# Patient Record
Sex: FEMALE | Race: BLACK OR AFRICAN AMERICAN | NOT HISPANIC OR LATINO | Employment: UNEMPLOYED | URBAN - METROPOLITAN AREA
[De-identification: names, ages, dates, MRNs, and addresses within clinical notes are randomized per-mention and may not be internally consistent; named-entity substitution may affect disease eponyms.]

---

## 2019-05-20 LAB
ABSOLUTE RETIC: 0.49 M/UL (ref 0.02–0.09)
ALBUMIN: 4.2 G/DL (ref 3.4–5)
ALP ISOS SERPL LEV INH-CCNC: 97 U/L (ref 46–116)
ALT (SGPT): 43 U/L (ref 14–59)
ANION GAP SERPL CALC-SCNC: 12 MMOL/L (ref 4–14)
AST SERPL-CCNC: 74 U/L (ref 15–37)
B19V IGG+IGM PNL SER: 25.8 % (ref 3–15.5)
BASOPHILS - REL (DIFF): 0.8 %
BASOPHILS NFR BLD: 0.09 K/UL (ref 0–0.1)
BILIRUB SERPL-MCNC: 3.3 MG/DL (ref 0.2–1)
BUN SERPL-MCNC: 11 MG/DL (ref 7–18)
BUN/CREAT RATIO: 18
CALCIUM SERPL-MCNC: 9.1 MG/DL (ref 8.5–10.1)
CHLORIDE SERPL-SCNC: 105 MMOL/L (ref 98–107)
CO2 SERPL-SCNC: 25 MMOL/L (ref 21–32)
CREAT SERPL-MCNC: 0.61 MG/DL (ref 0.55–1.02)
EOSINOPHILS - ABS (DIFF): 0.22 K/UL (ref 0–0.5)
EOSINOPHILS - REL (DIFF): 2 %
ERYTHROCYTE [DISTWIDTH] IN BLOOD BY AUTOMATED COUNT: 23.3 % (ref 12.3–17)
FERRITIN: 1755 NG/ML (ref 8–252)
GFR MDRD AF AMER: >60 ML/MIN
GFR MDRD NON AF AMER: >60 ML/MIN
GLUCOSE: 101 MG/DL (ref 74–106)
HCT VFR BLD AUTO: 24.6 % (ref 32–45.4)
HEMOGLOBIN A2: 3.2 % (ref 2.2–3.7)
HEMOGLOBIN F QUANTITATION: 6 % (ref 0–0.9)
HGB BLD-MCNC: 8.4 G/DL (ref 10.9–14.3)
IMM GRAN %: 0.6 % (ref 0–0.9)
IMMATURE GRANS (ABS): 0.07 K/UL (ref 0–0.1)
LYMPH #: 3.4 K/UL (ref 1–3)
LYMPH%: 31.1 %
MCH RBC QN AUTO: 30.4 PG (ref 24.3–33.2)
MCHC RBC AUTO-ENTMCNC: 34.1 G/DL (ref 29.6–33.8)
MCV RBC AUTO: 89.1 FL (ref 79.3–93.5)
MONO #: 1.7 K/UL (ref 0.3–1)
MONO%: 15.5 %
NEU %: 50 %
NEUTROPHILS ABSOLUTE: 5.46 K/UL (ref 1.8–7.8)
NUCLEATED RBC %: 3.2 % (ref 0–0.48)
NUCLEATED RBC ABSOLUTE: 0.4 K/UL
PEDIATRIC MONITOR INTERPRETATION: ABNORMAL
PLATELET COUNT: 328 K/UL (ref 159–386)
PLATELET MORPHOLOGY: NORMAL
PMV BLD AUTO: 11.8 FL (ref 9.1–12.7)
POTASSIUM: 4.4 MMOL/L (ref 3.5–5.1)
RBC # BLD AUTO: 2.76 M/UL (ref 3.63–4.92)
RBC MORPH BLD: NORMAL
RDW-SD: 71.1 FL (ref 37.5–49)
RETIC COUNT: 17.3 % (ref 0.5–2.2)
RETIC-HE: 31.3 PG (ref 28.1–38)
SODIUM: 142 MMOL/L (ref 136–145)
TOTAL PROTEIN: 8.5 G/DL (ref 6.4–8.2)
WBC # BLD AUTO: 10.94 K/UL (ref 3.6–11.2)

## 2019-05-22 LAB
ABO/RH(D): NORMAL
ANTIBODY SCREEN: NEGATIVE
ANTIGEN/ANTIBODY INFO (ON UNITS): NORMAL
BLOOD COMPONENT TYPE: NORMAL
BLOOD COMPONENT TYPE: NORMAL
CROSSMATCH EXPIRATION: NORMAL
CROSSMATCH RESULT: NORMAL
STATUS OF UNIT: NORMAL
TRANSFUSION STATUS: NORMAL
UNIT DIVISION: 0
UNIT NUMBER: NORMAL
UNIT TAG COMMENT: NORMAL
UNITS ORDERED: 1

## 2019-06-03 LAB
ABSOLUTE RETIC: 0.33 M/UL (ref 0.02–0.09)
ALBUMIN: 3.8 G/DL (ref 3.4–5)
ALP ISOS SERPL LEV INH-CCNC: 75 U/L (ref 46–116)
ALT (SGPT): 32 U/L (ref 14–59)
ANION GAP SERPL CALC-SCNC: 8 MMOL/L (ref 4–14)
AST SERPL-CCNC: 49 U/L (ref 15–37)
B19V IGG+IGM PNL SER: 22 % (ref 3–15.5)
BASOPHILS - REL (DIFF): 0.9 %
BASOPHILS NFR BLD: 0.08 K/UL (ref 0–0.1)
BILIRUB SERPL-MCNC: 2.7 MG/DL (ref 0.2–1)
BUN SERPL-MCNC: 9 MG/DL (ref 7–18)
BUN/CREAT RATIO: 15
CALCIUM SERPL-MCNC: 8.8 MG/DL (ref 8.5–10.1)
CHLORIDE SERPL-SCNC: 105 MMOL/L (ref 98–107)
CO2 SERPL-SCNC: 28 MMOL/L (ref 21–32)
CREAT SERPL-MCNC: 0.6 MG/DL (ref 0.55–1.02)
EOSINOPHILS - ABS (DIFF): 0.15 K/UL (ref 0–0.5)
EOSINOPHILS - REL (DIFF): 1.7 %
ERYTHROCYTE [DISTWIDTH] IN BLOOD BY AUTOMATED COUNT: 21.6 % (ref 12.3–17)
FERRITIN: 1622 NG/ML (ref 8–252)
GFR MDRD AF AMER: >60 ML/MIN
GFR MDRD NON AF AMER: >60 ML/MIN
GLUCOSE: 108 MG/DL (ref 74–106)
HCT VFR BLD AUTO: 22.8 % (ref 32–45.4)
HGB BLD-MCNC: 7.6 G/DL (ref 10.9–14.3)
IMM GRAN %: 0.2 % (ref 0–0.9)
IMMATURE GRANS (ABS): 0.02 K/UL (ref 0–0.1)
LYMPH #: 3.16 K/UL (ref 1–3)
LYMPH%: 35.4 %
MCH RBC QN AUTO: 30.2 PG (ref 24.3–33.2)
MCHC RBC AUTO-ENTMCNC: 33.3 G/DL (ref 29.6–33.8)
MCV RBC AUTO: 90.5 FL (ref 79.3–93.5)
MONO #: 1.29 K/UL (ref 0.3–1)
MONO%: 14.4 %
NEU %: 47.4 %
NEUTROPHILS ABSOLUTE: 4.23 K/UL (ref 1.8–7.8)
NUCLEATED RBC %: 1.3 % (ref 0–0.48)
NUCLEATED RBC ABSOLUTE: 0.1 K/UL
PLATELET COUNT: 433 K/UL (ref 159–386)
PMV BLD AUTO: 11.8 FL (ref 9.1–12.7)
POTASSIUM: 3.9 MMOL/L (ref 3.5–5.1)
RBC # BLD AUTO: 2.52 M/UL (ref 3.63–4.92)
RBC MORPH BLD: NORMAL
RDW-SD: 69.5 FL (ref 37.5–49)
RETIC COUNT: 13.3 % (ref 0.5–2.2)
RETIC-HE: 30.3 PG (ref 28.1–38)
SODIUM: 141 MMOL/L (ref 136–145)
TOTAL PROTEIN: 7.6 G/DL (ref 6.4–8.2)
WBC # BLD AUTO: 8.93 K/UL (ref 3.6–11.2)
WBC MORPHOLOGY: NORMAL

## 2019-06-04 LAB
HEMOGLOBIN A2: 2.8 % (ref 2.2–3.7)
HEMOGLOBIN F QUANTITATION: 5.8 % (ref 0–0.9)
PEDIATRIC MONITOR INTERPRETATION: ABNORMAL

## 2019-06-17 LAB
ABSOLUTE RETIC: 0.31 M/UL (ref 0.02–0.09)
ALBUMIN: 3.9 G/DL (ref 3.4–5)
ALP ISOS SERPL LEV INH-CCNC: 80 U/L (ref 45–117)
ALT (SGPT): 29 U/L (ref 13–56)
ANION GAP SERPL CALC-SCNC: 5 MMOL/L (ref 4–14)
AST SERPL-CCNC: 60 U/L (ref 15–37)
B19V IGG+IGM PNL SER: 15.7 % (ref 3–15.5)
BASOPHILS ABSOLUTE COUNT: 0.35 K/UL (ref 0–0.1)
BASOPHILS NFR BLD: 4 %
BILIRUB SERPL-MCNC: 2.5 MG/DL (ref 0.2–1)
BUN SERPL-MCNC: 10 MG/DL (ref 7–18)
BUN/CREAT RATIO: 16.1
CALCIUM SERPL-MCNC: 9.3 MG/DL (ref 8.5–10.1)
CHLORIDE SERPL-SCNC: 108 MMOL/L (ref 98–107)
CO2 SERPL-SCNC: 27 MMOL/L (ref 21–32)
CREAT SERPL-MCNC: 0.62 MG/DL (ref 0.6–1.3)
EOSINOPHIL NFR BLD: 1 %
EOSINOPHILS ABSOLUTE COUNT: 0.09 K/UL (ref 0–0.5)
ERYTHROCYTE [DISTWIDTH] IN BLOOD BY AUTOMATED COUNT: 20.6 % (ref 12.3–17)
GFR MDRD AF AMER: >60 ML/MIN
GFR MDRD NON AF AMER: >60 ML/MIN
GLUCOSE: 95 MG/DL (ref 74–106)
HCT VFR BLD AUTO: 23.6 % (ref 32–45.4)
HGB BLD-MCNC: 8.1 G/DL (ref 10.9–14.3)
LYMPHOCYTES ABSOLUTE COUNT: 2.08 K/UL (ref 1–3)
LYMPHOCYTES RELATIVE PERCENT: 24 %
MCH RBC QN AUTO: 30.2 PG (ref 24.3–33.2)
MCHC RBC AUTO-ENTMCNC: 34.3 G/DL (ref 29.6–33.8)
MCV RBC AUTO: 88.1 FL (ref 79.3–93.5)
MONOCYTE, ABS: 0.78 K/UL (ref 0.3–1)
MONOCYTES %: 9 %
NUCLEATED RBCS: 3 /100 WBC'S
PLATELET COUNT: 323 K/UL (ref 159–386)
PLATELET MORPHOLOGY: ABNORMAL
PMV BLD AUTO: 12 FL (ref 9.1–12.7)
POTASSIUM: 4.4 MMOL/L (ref 3.5–5.1)
PROT ELPH PNL UR ELPH: 62 %
RBC # BLD AUTO: 2.68 M/UL (ref 3.63–4.92)
RBC MORPH BLD: ABNORMAL
RDW-SD: 63.8 FL (ref 37.5–49)
RETIC COUNT: 11.9 % (ref 0.5–2.2)
RETIC-HE: 30.2 PG (ref 28.1–38)
SEGMENTED NEUTS, ABS: 5.38 K/UL (ref 1.8–7.8)
SODIUM: 140 MMOL/L (ref 136–145)
TOTAL PROTEIN: 8.2 G/DL (ref 6.4–8.2)
WBC # BLD AUTO: 8.67 K/UL (ref 3.6–11.2)
WBC MORPHOLOGY: ABNORMAL

## 2019-06-19 LAB
ABO/RH(D): NORMAL
ANTIBODY SCREEN: NEGATIVE
ANTIGEN/ANTIBODY INFO (ON UNITS): NORMAL
ANTIGEN/ANTIBODY INFO (ON UNITS): NORMAL
BLOOD COMPONENT TYPE: NORMAL
CROSSMATCH EXPIRATION: NORMAL
CROSSMATCH RESULT: NORMAL
CROSSMATCH RESULT: NORMAL
STATUS OF UNIT: NORMAL
STATUS OF UNIT: NORMAL
TRANSFUSION STATUS: NORMAL
TRANSFUSION STATUS: NORMAL
UNIT DIVISION: 0
UNIT DIVISION: 0
UNIT NUMBER: NORMAL
UNIT NUMBER: NORMAL
UNIT TAG COMMENT: NORMAL
UNIT TAG COMMENT: NORMAL
UNITS ORDERED: 1

## 2019-07-15 LAB
ABSOLUTE RETIC: 0.27 M/UL (ref 0.02–0.09)
ALBUMIN: 3.9 G/DL (ref 3.4–5)
ALP ISOS SERPL LEV INH-CCNC: 66 U/L (ref 46–116)
ALT (SGPT): 26 U/L (ref 14–59)
ANION GAP SERPL CALC-SCNC: 7 MMOL/L (ref 4–14)
AST SERPL-CCNC: 54 U/L (ref 15–37)
B19V IGG+IGM PNL SER: 33.6 % (ref 3–15.5)
BASOPHILS - REL (DIFF): 0.8 %
BASOPHILS NFR BLD: 0.08 K/UL (ref 0–0.1)
BILIRUB SERPL-MCNC: 2.8 MG/DL (ref 0.2–1)
BUN SERPL-MCNC: 8 MG/DL (ref 7–18)
BUN/CREAT RATIO: 12.5
CALCIUM SERPL-MCNC: 9.2 MG/DL (ref 8.5–10.1)
CHLORIDE SERPL-SCNC: 105 MMOL/L (ref 98–107)
CO2 SERPL-SCNC: 28 MMOL/L (ref 21–32)
CREAT SERPL-MCNC: 0.64 MG/DL (ref 0.55–1.02)
EOSINOPHILS - ABS (DIFF): 0.18 K/UL (ref 0–0.5)
EOSINOPHILS - REL (DIFF): 1.8 %
ERYTHROCYTE [DISTWIDTH] IN BLOOD BY AUTOMATED COUNT: 21.5 % (ref 12.3–17)
FERRITIN: 1704 NG/ML (ref 8–252)
GFR MDRD AF AMER: >60 ML/MIN
GFR MDRD NON AF AMER: >60 ML/MIN
GLUCOSE: 96 MG/DL (ref 74–106)
HCT VFR BLD AUTO: 22.5 % (ref 32–45.4)
HGB BLD-MCNC: 7.7 G/DL (ref 10.9–14.3)
IMM GRAN %: 0.6 % (ref 0–0.9)
IMMATURE GRANS (ABS): 0.06 K/UL (ref 0–0.1)
LYMPH #: 2.39 K/UL (ref 1–3)
LYMPH%: 24.1 %
MCH RBC QN AUTO: 30.6 PG (ref 24.3–33.2)
MCHC RBC AUTO-ENTMCNC: 34.2 G/DL (ref 29.6–33.8)
MCV RBC AUTO: 89.3 FL (ref 79.3–93.5)
MONO #: 1.31 K/UL (ref 0.3–1)
MONO%: 13.2 %
NEU %: 59.5 %
NEUTROPHILS ABSOLUTE: 5.9 K/UL (ref 1.8–7.8)
NUCLEATED RBC %: 1 % (ref 0–0.48)
NUCLEATED RBC ABSOLUTE: 0.1 K/UL
PLATELET COUNT: 316 K/UL (ref 159–386)
PMV BLD AUTO: 12.1 FL (ref 9.1–12.7)
POTASSIUM: 3.9 MMOL/L (ref 3.5–5.1)
RBC # BLD AUTO: 2.52 M/UL (ref 3.63–4.92)
RBC MORPH BLD: NORMAL
RDW-SD: 67.9 FL (ref 37.5–49)
RETIC COUNT: 10.8 % (ref 0.5–2.2)
RETIC-HE: 33.8 PG (ref 28.1–38)
SODIUM: 140 MMOL/L (ref 136–145)
TOTAL PROTEIN: 7.9 G/DL (ref 6.4–8.2)
WBC # BLD AUTO: 9.92 K/UL (ref 3.6–11.2)

## 2019-08-02 PROBLEM — D57.1 SICKLE CELL DISEASE: Status: ACTIVE | Noted: 2019-08-02

## 2019-08-12 LAB
ABSOLUTE RETIC: 0.4 M/UL (ref 0.02–0.09)
ALBUMIN: 4 G/DL (ref 3.4–5)
ALP ISOS SERPL LEV INH-CCNC: 70 U/L (ref 46–116)
ALT (SGPT): 27 U/L (ref 14–59)
ANION GAP SERPL CALC-SCNC: 6 MMOL/L (ref 4–14)
AST SERPL-CCNC: 53 U/L (ref 15–37)
B19V IGG+IGM PNL SER: 27.8 % (ref 3–15.5)
BASOPHILS - REL (DIFF): 0.8 %
BASOPHILS NFR BLD: 0.08 K/UL (ref 0–0.1)
BILIRUB SERPL-MCNC: 2.9 MG/DL (ref 0.2–1)
BUN SERPL-MCNC: 7 MG/DL (ref 7–18)
BUN/CREAT RATIO: 13.2
CALCIUM SERPL-MCNC: 9.6 MG/DL (ref 8.5–10.1)
CHLORIDE SERPL-SCNC: 106 MMOL/L (ref 98–107)
CO2 SERPL-SCNC: 30 MMOL/L (ref 21–32)
CREAT SERPL-MCNC: 0.53 MG/DL (ref 0.55–1.02)
EOSINOPHILS - ABS (DIFF): 0.19 K/UL (ref 0–0.5)
EOSINOPHILS - REL (DIFF): 1.9 %
ERYTHROCYTE [DISTWIDTH] IN BLOOD BY AUTOMATED COUNT: 22 % (ref 12.3–17)
FERRITIN: 1614 NG/ML (ref 8–252)
GFR MDRD AF AMER: >60 ML/MIN
GFR MDRD NON AF AMER: >60 ML/MIN
GLUCOSE: 96 MG/DL (ref 74–106)
HCT VFR BLD AUTO: 22.5 % (ref 32–45.4)
HGB BLD-MCNC: 7.6 G/DL (ref 10.9–14.3)
IMM GRAN %: 0.5 % (ref 0–0.9)
IMMATURE GRANS (ABS): 0.05 K/UL (ref 0–0.1)
LYMPH #: 3.34 K/UL (ref 1–3)
LYMPH%: 33.3 %
MCH RBC QN AUTO: 31 PG (ref 24.3–33.2)
MCHC RBC AUTO-ENTMCNC: 33.8 G/DL (ref 29.6–33.8)
MCV RBC AUTO: 91.8 FL (ref 79.3–93.5)
MONO #: 1.32 K/UL (ref 0.3–1)
MONO%: 13.2 %
NEU %: 50.3 %
NEUTROPHILS ABSOLUTE: 5.05 K/UL (ref 1.8–7.8)
NUCLEATED RBC %: 3.1 % (ref 0–0.48)
NUCLEATED RBC ABSOLUTE: 0.3 K/UL
PLATELET COUNT: 328 K/UL (ref 159–386)
PMV BLD AUTO: 11.6 FL (ref 9.1–12.7)
POTASSIUM: 4.1 MMOL/L (ref 3.5–5.1)
RBC # BLD AUTO: 2.45 M/UL (ref 3.63–4.92)
RBC MORPH BLD: NORMAL
RDW-SD: 69.3 FL (ref 37.5–49)
RETIC COUNT: 17 % (ref 0.5–2.2)
RETIC-HE: 32.9 PG (ref 28.1–38)
SODIUM: 142 MMOL/L (ref 136–145)
TOTAL PROTEIN: 7.8 G/DL (ref 6.4–8.2)
WBC # BLD AUTO: 10.03 K/UL (ref 3.6–11.2)

## 2019-12-16 PROBLEM — D57.1 HB-SS DISEASE WITHOUT CRISIS: Status: ACTIVE | Noted: 2019-12-16

## 2019-12-16 PROBLEM — E83.111 IRON OVERLOAD DUE TO REPEATED RED BLOOD CELL TRANSFUSIONS: Status: ACTIVE | Noted: 2019-12-16

## 2019-12-16 PROBLEM — Z51.5 ENCOUNTER FOR PALLIATIVE CARE INVOLVING MANAGEMENT OF PAIN: Status: ACTIVE | Noted: 2017-08-23

## 2020-01-26 PROBLEM — D57.00 SICKLE CELL ANEMIA WITH PAIN: Status: ACTIVE | Noted: 2020-01-26

## 2020-01-26 PROBLEM — D57.00 HB-SS DISEASE WITH CRISIS: Status: ACTIVE | Noted: 2019-08-02

## 2020-01-27 PROBLEM — I10 HYPERTENSION: Status: ACTIVE | Noted: 2020-01-27

## 2020-01-27 PROBLEM — E80.6 HYPERBILIRUBINEMIA: Status: ACTIVE | Noted: 2020-01-27

## 2020-01-28 PROBLEM — D57.00 HB-SS DISEASE WITH CRISIS: Status: RESOLVED | Noted: 2019-08-02 | Resolved: 2020-01-28

## 2020-03-24 PROBLEM — E83.19 IRON OVERLOAD: Status: ACTIVE | Noted: 2020-03-24

## 2020-03-24 PROBLEM — Z51.5 PALLIATIVE CARE BY SPECIALIST: Status: ACTIVE | Noted: 2020-03-24

## 2020-03-24 PROBLEM — F11.90 CHRONIC, CONTINUOUS USE OF OPIOIDS: Status: ACTIVE | Noted: 2020-03-24

## 2020-03-24 PROBLEM — Z92.89 HISTORY OF PARTIAL EXCHANGE TRANSFUSION: Status: ACTIVE | Noted: 2020-03-24

## 2020-04-28 ENCOUNTER — TELEPHONE (OUTPATIENT)
Dept: ADMINISTRATIVE | Facility: CLINIC | Age: 35
End: 2020-04-28

## 2020-06-09 PROBLEM — D57.1 SICKLE CELL ANEMIA WITHOUT CRISIS: Status: ACTIVE | Noted: 2020-06-09

## 2020-10-01 PROBLEM — G44.51 HEMICRANIA CONTINUA: Status: ACTIVE | Noted: 2020-10-01

## 2020-10-01 PROBLEM — G43.009 MIGRAINE WITHOUT AURA AND WITHOUT STATUS MIGRAINOSUS, NOT INTRACTABLE: Status: ACTIVE | Noted: 2020-10-01

## 2021-01-14 PROBLEM — M79.671 BILATERAL LEG AND FOOT PAIN: Status: ACTIVE | Noted: 2021-01-14

## 2021-01-14 PROBLEM — R74.01 ELEVATED AST (SGOT): Status: ACTIVE | Noted: 2021-01-14

## 2021-01-14 PROBLEM — R70.1 RETICULOCYTOSIS: Status: ACTIVE | Noted: 2021-01-14

## 2021-01-14 PROBLEM — M79.604 BILATERAL LEG AND FOOT PAIN: Status: ACTIVE | Noted: 2021-01-14

## 2021-01-14 PROBLEM — D75.839 THROMBOCYTOSIS: Status: ACTIVE | Noted: 2021-01-14

## 2021-01-14 PROBLEM — G43.709 CHRONIC MIGRAINE WITHOUT AURA WITHOUT STATUS MIGRAINOSUS, NOT INTRACTABLE: Status: ACTIVE | Noted: 2020-10-01

## 2021-01-14 PROBLEM — D57.00 SICKLE CELL PAIN CRISIS: Status: ACTIVE | Noted: 2021-01-14

## 2021-01-14 PROBLEM — E66.9 OBESITY (BMI 30.0-34.9): Status: ACTIVE | Noted: 2021-01-14

## 2021-01-14 PROBLEM — D72.829 LEUKOCYTOSIS: Status: ACTIVE | Noted: 2021-01-14

## 2021-01-14 PROBLEM — M79.672 BILATERAL LEG AND FOOT PAIN: Status: ACTIVE | Noted: 2021-01-14

## 2021-01-14 PROBLEM — M79.605 BILATERAL LEG AND FOOT PAIN: Status: ACTIVE | Noted: 2021-01-14

## 2021-01-15 PROBLEM — D75.839 THROMBOCYTOSIS: Status: RESOLVED | Noted: 2021-01-14 | Resolved: 2021-01-15

## 2021-01-18 PROBLEM — E87.6 HYPOKALEMIA: Status: ACTIVE | Noted: 2021-01-18

## 2021-05-12 ENCOUNTER — PATIENT MESSAGE (OUTPATIENT)
Dept: RESEARCH | Facility: HOSPITAL | Age: 36
End: 2021-05-12

## 2021-05-18 PROBLEM — M25.511 BILATERAL SHOULDER PAIN: Status: ACTIVE | Noted: 2021-05-18

## 2021-05-18 PROBLEM — M25.512 BILATERAL SHOULDER PAIN: Status: ACTIVE | Noted: 2021-05-18

## 2021-08-26 DIAGNOSIS — U07.1 COVID-19 VIRUS DETECTED: ICD-10-CM

## 2021-08-27 PROBLEM — J12.82 PNEUMONIA DUE TO COVID-19 VIRUS: Status: ACTIVE | Noted: 2021-08-27

## 2021-08-27 PROBLEM — J96.01 ACUTE HYPOXEMIC RESPIRATORY FAILURE: Status: ACTIVE | Noted: 2021-08-27

## 2021-08-27 PROBLEM — G43.909 MIGRAINE: Status: ACTIVE | Noted: 2021-08-27

## 2021-08-27 PROBLEM — U07.1 COVID-19: Status: ACTIVE | Noted: 2021-08-27

## 2021-08-29 PROBLEM — E87.6 HYPOKALEMIA: Status: RESOLVED | Noted: 2021-01-18 | Resolved: 2021-08-29

## 2021-08-29 PROBLEM — Z51.5 ENCOUNTER FOR PALLIATIVE CARE INVOLVING MANAGEMENT OF PAIN: Status: RESOLVED | Noted: 2017-08-23 | Resolved: 2021-08-29

## 2021-08-29 PROBLEM — M79.605 BILATERAL LEG AND FOOT PAIN: Status: RESOLVED | Noted: 2021-01-14 | Resolved: 2021-08-29

## 2021-08-29 PROBLEM — R70.1 RETICULOCYTOSIS: Status: RESOLVED | Noted: 2021-01-14 | Resolved: 2021-08-29

## 2021-08-29 PROBLEM — M79.672 BILATERAL LEG AND FOOT PAIN: Status: RESOLVED | Noted: 2021-01-14 | Resolved: 2021-08-29

## 2021-08-29 PROBLEM — D64.9 ACUTE ON CHRONIC ANEMIA: Status: ACTIVE | Noted: 2021-08-29

## 2021-08-29 PROBLEM — E83.111 IRON OVERLOAD DUE TO REPEATED RED BLOOD CELL TRANSFUSIONS: Status: RESOLVED | Noted: 2019-12-16 | Resolved: 2021-08-29

## 2021-08-29 PROBLEM — R74.01 ELEVATED AST (SGOT): Status: RESOLVED | Noted: 2021-01-14 | Resolved: 2021-08-29

## 2021-08-29 PROBLEM — J96.01 ACUTE HYPOXEMIC RESPIRATORY FAILURE: Status: RESOLVED | Noted: 2021-08-27 | Resolved: 2021-08-29

## 2021-08-29 PROBLEM — D72.829 LEUKOCYTOSIS: Status: RESOLVED | Noted: 2021-01-14 | Resolved: 2021-08-29

## 2021-08-29 PROBLEM — M79.604 BILATERAL LEG AND FOOT PAIN: Status: RESOLVED | Noted: 2021-01-14 | Resolved: 2021-08-29

## 2021-08-29 PROBLEM — M25.511 BILATERAL SHOULDER PAIN: Status: RESOLVED | Noted: 2021-05-18 | Resolved: 2021-08-29

## 2021-08-29 PROBLEM — M25.512 BILATERAL SHOULDER PAIN: Status: RESOLVED | Noted: 2021-05-18 | Resolved: 2021-08-29

## 2021-08-29 PROBLEM — D57.00 HB-SS DISEASE WITH CRISIS: Status: RESOLVED | Noted: 2019-08-02 | Resolved: 2021-08-29

## 2021-08-29 PROBLEM — D57.00 SICKLE CELL PAIN CRISIS: Status: RESOLVED | Noted: 2021-01-14 | Resolved: 2021-08-29

## 2021-08-29 PROBLEM — M79.671 BILATERAL LEG AND FOOT PAIN: Status: RESOLVED | Noted: 2021-01-14 | Resolved: 2021-08-29

## 2021-10-05 PROBLEM — D57.1 HB-SS DISEASE WITHOUT CRISIS: Chronic | Status: ACTIVE | Noted: 2019-12-16

## 2022-05-11 PROBLEM — R11.0 NAUSEA: Status: ACTIVE | Noted: 2022-05-11

## 2022-05-18 PROBLEM — G47.01 INSOMNIA DUE TO MEDICAL CONDITION: Status: ACTIVE | Noted: 2022-05-18

## 2022-07-06 PROBLEM — L29.8 ITCHING DUE TO DRUG: Status: ACTIVE | Noted: 2022-07-06

## 2022-07-06 PROBLEM — T50.905A ITCHING DUE TO DRUG: Status: ACTIVE | Noted: 2022-07-06

## 2022-10-16 PROBLEM — D57.01 ACUTE CHEST SYNDROME: Status: ACTIVE | Noted: 2022-10-16

## 2022-10-16 PROBLEM — R09.02 HYPOXIA: Status: ACTIVE | Noted: 2022-10-16

## 2022-10-18 PROBLEM — J18.9 COMMUNITY ACQUIRED PNEUMONIA: Status: ACTIVE | Noted: 2022-10-18

## 2023-01-23 PROBLEM — J18.9 COMMUNITY ACQUIRED PNEUMONIA: Status: RESOLVED | Noted: 2022-10-18 | Resolved: 2023-01-23

## 2023-09-15 ENCOUNTER — PATIENT MESSAGE (OUTPATIENT)
Dept: ADMINISTRATIVE | Facility: CLINIC | Age: 38
End: 2023-09-15

## 2023-09-15 ENCOUNTER — PATIENT OUTREACH (OUTPATIENT)
Dept: ADMINISTRATIVE | Facility: CLINIC | Age: 38
End: 2023-09-15

## 2023-09-15 NOTE — PROGRESS NOTES
C3 nurse attempted to contact Shumearria S Adley for a TCC post hospital discharge follow up call. No answer. Left voicemail with callback information. The patient has a scheduled [facility] appointment with Essence Montelongo on 09/27/2023 @ 0760.

## 2023-09-18 NOTE — PROGRESS NOTES
C3 nurse attempted to contact Shumearria S Adley for a TCC post hospital discharge follow up call. No answer. Left voicemail with callback information. The patient has a scheduled [facility] appointment with Essence Montelongo on 09/27/2023 @ [id number].

## 2023-09-18 NOTE — PROGRESS NOTES
C3 nurse attempted to contact Shumearria S Adley for a TCC post hospital discharge follow up call. No answer. Left voicemail with callback information. The patient has a scheduled [facility] appointment with Essence Montelongo on 09/27/2023 @ 1503.

## 2024-02-21 PROBLEM — R10.2 CHRONIC PELVIC PAIN IN FEMALE: Status: ACTIVE | Noted: 2024-02-21

## 2024-02-21 PROBLEM — N92.0 MENORRHAGIA WITH REGULAR CYCLE: Status: ACTIVE | Noted: 2024-02-21

## 2024-02-21 PROBLEM — Z90.81 HISTORY OF SPLENECTOMY: Status: ACTIVE | Noted: 2024-02-21

## 2024-02-21 PROBLEM — Z98.891 HISTORY OF CESAREAN SECTION: Status: ACTIVE | Noted: 2024-02-21

## 2024-02-21 PROBLEM — Z98.51 HISTORY OF TUBAL LIGATION: Status: ACTIVE | Noted: 2024-02-21

## 2024-02-21 PROBLEM — Z90.49 HISTORY OF CHOLECYSTECTOMY: Status: ACTIVE | Noted: 2024-02-21

## 2024-02-21 PROBLEM — G89.29 CHRONIC PELVIC PAIN IN FEMALE: Status: ACTIVE | Noted: 2024-02-21

## 2024-02-21 PROBLEM — D50.0 ANEMIA, BLOOD LOSS: Status: ACTIVE | Noted: 2024-02-21

## 2024-02-26 PROBLEM — Z98.51 POST-TUBAL LIGATION SYNDROME: Status: ACTIVE | Noted: 2024-02-26

## 2024-02-26 PROBLEM — N92.1 MENORRHAGIA WITH IRREGULAR CYCLE: Status: ACTIVE | Noted: 2024-02-26

## 2024-02-26 PROBLEM — N99.89 POST-TUBAL LIGATION SYNDROME: Status: ACTIVE | Noted: 2024-02-26

## 2024-02-28 PROBLEM — Z90.710 STATUS POST LAPAROSCOPIC HYSTERECTOMY: Status: ACTIVE | Noted: 2024-02-28

## 2024-03-03 PROBLEM — E87.20 METABOLIC ACIDOSIS: Status: ACTIVE | Noted: 2024-03-03

## 2024-03-03 PROBLEM — R80.1 PERSISTENT PROTEINURIA: Status: ACTIVE | Noted: 2024-03-03
